# Patient Record
Sex: FEMALE | Race: BLACK OR AFRICAN AMERICAN | Employment: FULL TIME | URBAN - METROPOLITAN AREA
[De-identification: names, ages, dates, MRNs, and addresses within clinical notes are randomized per-mention and may not be internally consistent; named-entity substitution may affect disease eponyms.]

---

## 2019-06-23 ENCOUNTER — HOSPITAL ENCOUNTER (EMERGENCY)
Age: 44
Discharge: HOME | End: 2019-06-23
Attending: STUDENT IN AN ORGANIZED HEALTH CARE EDUCATION/TRAINING PROGRAM
Payer: COMMERCIAL

## 2019-06-23 ENCOUNTER — APPOINTMENT (OUTPATIENT)
Dept: ULTRASOUND IMAGING | Age: 44
End: 2019-06-23
Attending: PHYSICIAN ASSISTANT
Payer: COMMERCIAL

## 2019-06-23 VITALS
SYSTOLIC BLOOD PRESSURE: 114 MMHG | HEART RATE: 62 BPM | DIASTOLIC BLOOD PRESSURE: 72 MMHG | HEIGHT: 57 IN | OXYGEN SATURATION: 100 % | BODY MASS INDEX: 47.03 KG/M2 | TEMPERATURE: 97.9 F | WEIGHT: 218 LBS | RESPIRATION RATE: 17 BRPM

## 2019-06-23 DIAGNOSIS — R10.13 ABDOMINAL PAIN, EPIGASTRIC: Primary | ICD-10-CM

## 2019-06-23 DIAGNOSIS — K80.20 GALLSTONES: ICD-10-CM

## 2019-06-23 LAB
ALBUMIN SERPL-MCNC: 3.7 G/DL (ref 3.5–5)
ALBUMIN/GLOB SERPL: 0.9 {RATIO} (ref 1.1–2.2)
ALP SERPL-CCNC: 71 U/L (ref 45–117)
ALT SERPL-CCNC: 17 U/L (ref 12–78)
ANION GAP SERPL CALC-SCNC: 7 MMOL/L (ref 5–15)
APPEARANCE UR: CLEAR
AST SERPL-CCNC: 12 U/L (ref 15–37)
BACTERIA URNS QL MICRO: NEGATIVE /HPF
BASOPHILS # BLD: 0.1 K/UL (ref 0–0.1)
BASOPHILS NFR BLD: 1 % (ref 0–1)
BILIRUB SERPL-MCNC: 0.4 MG/DL (ref 0.2–1)
BILIRUB UR QL: NEGATIVE
BUN SERPL-MCNC: 10 MG/DL (ref 6–20)
BUN/CREAT SERPL: 13 (ref 12–20)
CALCIUM SERPL-MCNC: 9 MG/DL (ref 8.5–10.1)
CHLORIDE SERPL-SCNC: 107 MMOL/L (ref 97–108)
CO2 SERPL-SCNC: 24 MMOL/L (ref 21–32)
COLOR UR: NORMAL
COMMENT, HOLDF: NORMAL
CREAT SERPL-MCNC: 0.79 MG/DL (ref 0.55–1.02)
DIFFERENTIAL METHOD BLD: ABNORMAL
EOSINOPHIL # BLD: 0 K/UL (ref 0–0.4)
EOSINOPHIL NFR BLD: 0 % (ref 0–7)
EPITH CASTS URNS QL MICRO: NORMAL /LPF
ERYTHROCYTE [DISTWIDTH] IN BLOOD BY AUTOMATED COUNT: 13.9 % (ref 11.5–14.5)
GLOBULIN SER CALC-MCNC: 4.1 G/DL (ref 2–4)
GLUCOSE SERPL-MCNC: 94 MG/DL (ref 65–100)
GLUCOSE UR STRIP.AUTO-MCNC: NEGATIVE MG/DL
HCT VFR BLD AUTO: 37.3 % (ref 35–47)
HGB BLD-MCNC: 11.6 G/DL (ref 11.5–16)
HGB UR QL STRIP: NEGATIVE
HYALINE CASTS URNS QL MICRO: NORMAL /LPF (ref 0–5)
IMM GRANULOCYTES # BLD AUTO: 0 K/UL (ref 0–0.04)
IMM GRANULOCYTES NFR BLD AUTO: 0 % (ref 0–0.5)
KETONES UR QL STRIP.AUTO: NEGATIVE MG/DL
LEUKOCYTE ESTERASE UR QL STRIP.AUTO: NEGATIVE
LIPASE SERPL-CCNC: 86 U/L (ref 73–393)
LYMPHOCYTES # BLD: 1.6 K/UL (ref 0.8–3.5)
LYMPHOCYTES NFR BLD: 19 % (ref 12–49)
MCH RBC QN AUTO: 27.8 PG (ref 26–34)
MCHC RBC AUTO-ENTMCNC: 31.1 G/DL (ref 30–36.5)
MCV RBC AUTO: 89.4 FL (ref 80–99)
MONOCYTES # BLD: 0.3 K/UL (ref 0–1)
MONOCYTES NFR BLD: 4 % (ref 5–13)
NEUTS SEG # BLD: 6.6 K/UL (ref 1.8–8)
NEUTS SEG NFR BLD: 76 % (ref 32–75)
NITRITE UR QL STRIP.AUTO: NEGATIVE
NRBC # BLD: 0 K/UL (ref 0–0.01)
NRBC BLD-RTO: 0 PER 100 WBC
PH UR STRIP: 7.5 [PH] (ref 5–8)
PLATELET # BLD AUTO: 346 K/UL (ref 150–400)
PMV BLD AUTO: 9.8 FL (ref 8.9–12.9)
POTASSIUM SERPL-SCNC: 4 MMOL/L (ref 3.5–5.1)
PROT SERPL-MCNC: 7.8 G/DL (ref 6.4–8.2)
PROT UR STRIP-MCNC: NEGATIVE MG/DL
RBC # BLD AUTO: 4.17 M/UL (ref 3.8–5.2)
RBC #/AREA URNS HPF: NORMAL /HPF (ref 0–5)
SAMPLES BEING HELD,HOLD: NORMAL
SODIUM SERPL-SCNC: 138 MMOL/L (ref 136–145)
SP GR UR REFRACTOMETRY: 1.02 (ref 1–1.03)
TROPONIN I SERPL-MCNC: <0.05 NG/ML
UR CULT HOLD, URHOLD: NORMAL
UROBILINOGEN UR QL STRIP.AUTO: 1 EU/DL (ref 0.2–1)
WBC # BLD AUTO: 8.6 K/UL (ref 3.6–11)
WBC URNS QL MICRO: NORMAL /HPF (ref 0–4)

## 2019-06-23 PROCEDURE — 93005 ELECTROCARDIOGRAM TRACING: CPT

## 2019-06-23 PROCEDURE — 99284 EMERGENCY DEPT VISIT MOD MDM: CPT

## 2019-06-23 PROCEDURE — 96361 HYDRATE IV INFUSION ADD-ON: CPT

## 2019-06-23 PROCEDURE — 96375 TX/PRO/DX INJ NEW DRUG ADDON: CPT

## 2019-06-23 PROCEDURE — 76705 ECHO EXAM OF ABDOMEN: CPT

## 2019-06-23 PROCEDURE — 36415 COLL VENOUS BLD VENIPUNCTURE: CPT

## 2019-06-23 PROCEDURE — 83690 ASSAY OF LIPASE: CPT

## 2019-06-23 PROCEDURE — 96374 THER/PROPH/DIAG INJ IV PUSH: CPT

## 2019-06-23 PROCEDURE — 85025 COMPLETE CBC W/AUTO DIFF WBC: CPT

## 2019-06-23 PROCEDURE — 84484 ASSAY OF TROPONIN QUANT: CPT

## 2019-06-23 PROCEDURE — 80053 COMPREHEN METABOLIC PANEL: CPT

## 2019-06-23 PROCEDURE — 81001 URINALYSIS AUTO W/SCOPE: CPT

## 2019-06-23 PROCEDURE — 74011250636 HC RX REV CODE- 250/636: Performed by: PHYSICIAN ASSISTANT

## 2019-06-23 RX ORDER — ONDANSETRON 4 MG/1
4 TABLET, ORALLY DISINTEGRATING ORAL
Qty: 10 TAB | Refills: 0 | Status: SHIPPED | OUTPATIENT
Start: 2019-06-23 | End: 2019-07-15

## 2019-06-23 RX ORDER — OMEPRAZOLE 20 MG/1
20 TABLET, DELAYED RELEASE ORAL DAILY
Qty: 20 TAB | Refills: 0 | Status: SHIPPED | OUTPATIENT
Start: 2019-06-23

## 2019-06-23 RX ORDER — ONDANSETRON 2 MG/ML
4 INJECTION INTRAMUSCULAR; INTRAVENOUS
Status: COMPLETED | OUTPATIENT
Start: 2019-06-23 | End: 2019-06-23

## 2019-06-23 RX ORDER — KETOROLAC TROMETHAMINE 30 MG/ML
15 INJECTION, SOLUTION INTRAMUSCULAR; INTRAVENOUS
Status: COMPLETED | OUTPATIENT
Start: 2019-06-23 | End: 2019-06-23

## 2019-06-23 RX ADMIN — SODIUM CHLORIDE 1000 ML: 900 INJECTION, SOLUTION INTRAVENOUS at 09:05

## 2019-06-23 RX ADMIN — KETOROLAC TROMETHAMINE 15 MG: 30 INJECTION, SOLUTION INTRAMUSCULAR at 10:03

## 2019-06-23 RX ADMIN — ONDANSETRON 4 MG: 2 INJECTION INTRAMUSCULAR; INTRAVENOUS at 09:05

## 2019-06-23 NOTE — ED TRIAGE NOTES
Pt reports mid abd pain that radiates into right back that began last night. Pt also reports nausea and vomiting x 1 this morning. Pt denies urinary symptoms.

## 2019-06-23 NOTE — ED PROVIDER NOTES
37year old female presenting to the ED for abdominal pain. Pt reports epigastric abdominal pain, dull and stabbing, that radiates to the back, started at about 9PM last night. Kept pt up overnight. Radiates tot he back on the right side. Had chicken tenders and onion rings for dinner last night. Started vomiting this morning, vomited x 1, no hematemesis. No fever or chills. No diarrhea or constipation. No frequent NSAID use. No significant alcohol use. Notes that she will sometimes have similar pain after larger meals. Feels similar to when she was diagnosed with gallstones many years ago. No UTI symptoms. PMHx: denies  PSx: myomectomy  Social: no tobacco, alcohol, or drug use           History reviewed. No pertinent past medical history.     Past Surgical History:   Procedure Laterality Date    HX OTHER SURGICAL      myomectomy 09    MYOMECTOMY 1-4,W/TOT 250GMS/<,ABD APPRCH           Family History:   Problem Relation Age of Onset    Hypertension Mother     Hypertension Father     Cancer Maternal Uncle         prostate    Hypertension Sister     Heart Disease Paternal Grandmother         MI    Hypertension Paternal Grandmother        Social History     Socioeconomic History    Marital status:      Spouse name: Not on file    Number of children: Not on file    Years of education: Not on file    Highest education level: Not on file   Occupational History    Occupation:      Employer: [employer] resource strain: Not on file    Food insecurity:     Worry: Not on file     Inability: Not on file    Transportation needs:     Medical: Not on file     Non-medical: Not on file   Tobacco Use    Smoking status: Never Smoker    Smokeless tobacco: Never Used   Substance and Sexual Activity    Alcohol use: No    Drug use: No    Sexual activity: Yes     Partners: Male     Birth control/protection: None     Comment:    Lifestyle    Physical activity:     Days per week: Not on file     Minutes per session: Not on file    Stress: Not on file   Relationships    Social connections:     Talks on phone: Not on file     Gets together: Not on file     Attends Sikhism service: Not on file     Active member of club or organization: Not on file     Attends meetings of clubs or organizations: Not on file     Relationship status: Not on file    Intimate partner violence:     Fear of current or ex partner: Not on file     Emotionally abused: Not on file     Physically abused: Not on file     Forced sexual activity: Not on file   Other Topics Concern    Not on file   Social History Narrative    Not on file         ALLERGIES: Patient has no known allergies. Review of Systems   Constitutional: Negative for fever. Respiratory: Negative for shortness of breath. Cardiovascular: Negative for chest pain. Gastrointestinal: Positive for abdominal pain, nausea and vomiting. Genitourinary: Negative for dysuria. Musculoskeletal: Negative for neck stiffness. Skin: Negative for wound. Neurological: Negative for syncope. All other systems reviewed and are negative. Vitals:    06/23/19 0806 06/23/19 0831 06/23/19 1112   BP:  120/81 114/72   Pulse: 88 68 62   Resp:  16 17   Temp:  98.2 °F (36.8 °C) 97.9 °F (36.6 °C)   SpO2: 100% 100% 100%   Weight:  98.9 kg (218 lb)    Height:  4' 9\" (1.448 m)             Physical Exam   Constitutional: She is oriented to person, place, and time. She appears well-developed and well-nourished. No distress. HENT:   Head: Normocephalic and atraumatic. Right Ear: External ear normal.   Left Ear: External ear normal.   Eyes: Conjunctivae are normal. No scleral icterus. Neck: Neck supple. No tracheal deviation present. Cardiovascular: Normal rate, regular rhythm and normal heart sounds. Exam reveals no gallop and no friction rub. No murmur heard.   Pulmonary/Chest: Effort normal and breath sounds normal. No stridor. No respiratory distress. She has no wheezes. Abdominal: Soft. She exhibits no distension. Mild epigastric tenderness  No true Mclaughlin's sign   Musculoskeletal: Normal range of motion. Neurological: She is alert and oriented to person, place, and time. Skin: Skin is warm and dry. Psychiatric: She has a normal mood and affect. Her behavior is normal.   Nursing note and vitals reviewed. MDM  Number of Diagnoses or Management Options  Abdominal pain, epigastric:   Gallstones:   Diagnosis management comments: 37year old female presenting for epigastric pain since last night. Known gallstones, no RUQ TTP on exam.  Labs remarkable for normal WBC, LFTs, lipase. Much improved with fluids, toradol. US showing gallstones without cholecystitis. Discussed with pt possible pain related to gallstones, possible PUD/gastritis. Will try prilosec, encouraged low fat diet, given GI and general surgery for follow up. Return precautions given.        Amount and/or Complexity of Data Reviewed  Clinical lab tests: ordered and reviewed  Tests in the radiology section of CPT®: ordered and reviewed  Discuss the patient with other providers: yes (Dr. Khalida Rose ED attending)           Procedures

## 2019-06-23 NOTE — DISCHARGE INSTRUCTIONS
Patient Education        Abdominal Pain: Care Instructions  Your Care Instructions    Abdominal pain has many possible causes. Some aren't serious and get better on their own in a few days. Others need more testing and treatment. If your pain continues or gets worse, you need to be rechecked and may need more tests to find out what is wrong. You may need surgery to correct the problem. Don't ignore new symptoms, such as fever, nausea and vomiting, urination problems, pain that gets worse, and dizziness. These may be signs of a more serious problem. Your doctor may have recommended a follow-up visit in the next 8 to 12 hours. If you are not getting better, you may need more tests or treatment. The doctor has checked you carefully, but problems can develop later. If you notice any problems or new symptoms, get medical treatment right away. Follow-up care is a key part of your treatment and safety. Be sure to make and go to all appointments, and call your doctor if you are having problems. It's also a good idea to know your test results and keep a list of the medicines you take. How can you care for yourself at home? · Rest until you feel better. · To prevent dehydration, drink plenty of fluids, enough so that your urine is light yellow or clear like water. Choose water and other caffeine-free clear liquids until you feel better. If you have kidney, heart, or liver disease and have to limit fluids, talk with your doctor before you increase the amount of fluids you drink. · If your stomach is upset, eat mild foods, such as rice, dry toast or crackers, bananas, and applesauce. Try eating several small meals instead of two or three large ones. · Wait until 48 hours after all symptoms have gone away before you have spicy foods, alcohol, and drinks that contain caffeine. · Do not eat foods that are high in fat. · Avoid anti-inflammatory medicines such as aspirin, ibuprofen (Advil, Motrin), and naproxen (Aleve). These can cause stomach upset. Talk to your doctor if you take daily aspirin for another health problem. When should you call for help? Call 911 anytime you think you may need emergency care. For example, call if:    · You passed out (lost consciousness).     · You pass maroon or very bloody stools.     · You vomit blood or what looks like coffee grounds.     · You have new, severe belly pain.    Call your doctor now or seek immediate medical care if:    · Your pain gets worse, especially if it becomes focused in one area of your belly.     · You have a new or higher fever.     · Your stools are black and look like tar, or they have streaks of blood.     · You have unexpected vaginal bleeding.     · You have symptoms of a urinary tract infection. These may include:  ? Pain when you urinate. ? Urinating more often than usual.  ? Blood in your urine.     · You are dizzy or lightheaded, or you feel like you may faint.    Watch closely for changes in your health, and be sure to contact your doctor if:    · You are not getting better after 1 day (24 hours). Where can you learn more? Go to http://chino-daynel.info/. Enter T496 in the search box to learn more about \"Abdominal Pain: Care Instructions. \"  Current as of: September 23, 2018  Content Version: 11.9  © 5817-6200 [employer]. Care instructions adapted under license by [employer] (which disclaims liability or warranty for this information). If you have questions about a medical condition or this instruction, always ask your healthcare professional. [place] [address] any warranty or liability for your use of this information. Patient Education        Low-Fat Diet for Gallbladder Disease: Care Instructions  Your Care Instructions    When you eat, the gallbladder releases bile, which helps you digest the fat in food. If you have an inflamed gallbladder, this may cause pain.  A low-fat diet may give your gallbladder a rest so you can start to heal. Your doctor and dietitian can help you make an eating plan that does not irritate your digestive system. Always talk with your doctor or dietitian before you make changes in your diet. Follow-up care is a key part of your treatment and safety. Be sure to make and go to all appointments, and call your doctor if you are having problems. It's also a good idea to know your test results and keep a list of the medicines you take. How can you care for yourself at home? · Eat many small meals and snacks each day instead of three large meals. · Choose lean meats. ? Eat no more than 5 to 6½ ounces of meat a day. ? Cut off all fat you can see. ? Eat chicken and turkey without the skin. ? Many types of fish, such as salmon, lake trout, tuna, and herring, provide healthy omega-3 fat. But, avoid fish canned in oil, such as sardines in olive oil. ? Bake, broil, or grill meats, poultry, or fish instead of frying them in butter or fat. · Drink or eat nonfat or low-fat milk, yogurt, cheese, or other milk products each day. ? Read the labels on cheeses, and choose those with less than 5 grams of fat an ounce. ? Try fat-free sour cream, cream cheese, or yogurt. ? Avoid cream soups and cream sauces on pasta. ? Eat low-fat ice cream, frozen yogurt, or sorbet. Avoid regular ice cream.  · Eat whole-grain cereals, breads, crackers, rice, or pasta. Avoid high-fat foods such as croissants, scones, biscuits, waffles, doughnuts, muffins, granola, and high-fat breads. · Flavor your foods with herbs and spices (such as basil, tarragon, or mint), fat-free sauces, or lemon juice instead of butter. You can also use butter substitutes, fat-free mayonnaise, or fat-free dressing. · Try applesauce, prune puree, or mashed bananas to replace some or all of the fat when you bake.   · Limit fats and oils, such as butter, margarine, mayonnaise, and salad dressing, to no more than 1 tablespoon a meal.  · Avoid high-fat foods, such as:  ? Chocolate, whole milk, ice cream, and processed cheese. ? Fried or buttered foods. ? Sausage, salami, and frederick. ? Cinnamon rolls, cakes, pies, cookies, and other pastries. ? Prepared snack foods, such as potato chips, nut and granola bars, and mixed nuts. ? Coconut and avocado. · Learn how to read food labels for serving sizes and ingredients. Fast-food and convenience-food meals often have lots of fat. Where can you learn more? Go to http://chino-danyel.info/. Enter J906 in the search box to learn more about \"Low-Fat Diet for Gallbladder Disease: Care Instructions. \"  Current as of: March 28, 2018  Content Version: 11.9  © 7229-3769 [employer]. Care instructions adapted under license by [employer] (which disclaims liability or warranty for this information). If you have questions about a medical condition or this instruction, always ask your healthcare professional. [place] [address] any warranty or liability for your use of this information. Patient Education        Biliary Colic: Care Instructions  Your Care Instructions    Biliary (say \"BILL-ee-air-ee\") colic is belly pain caused by gallbladder problems. It is usually caused by a gallstone moving through or blocking the common bile duct or cystic duct. Gallstones are stones that form in the gallbladder. They are made of cholesterol and other substances. The gallbladder is a small sac located just under the liver. It stores bile released by the liver. Bile helps you digest fats. Gallstones also can form in the common bile duct or cystic duct. These ducts carry bile from the gallbladder and the liver to the small intestine. Gallstones may be as small as a grain of sand or as large as a golf ball. Gallstones that cause severe symptoms usually are treated with surgery to remove the gallbladder.  If the first attack of biliary colic is mild, it is often safe to wait until you have had another attack before you think about having surgery. The doctor has checked you carefully, but problems can develop later. If you notice any problems or new symptoms, get medical treatment right away. Follow-up care is a key part of your treatment and safety. Be sure to make and go to all appointments, and call your doctor if you are having problems. It's also a good idea to know your test results and keep a list of the medicines you take. How can you care for yourself at home? · Take pain medicines exactly as directed. ? If the doctor gave you a prescription medicine for pain, take it as prescribed. ? If you are not taking a prescription pain medicine, ask your doctor if you can take an over-the-counter medicine. Read and follow all instructions on the label. · Avoid foods that cause symptoms, especially fatty foods. These can cause biliary colic. · You may need more tests to look at your gallbladder. When should you call for help? Call your doctor now or seek immediate medical care if:    · You have a fever.     · You have new belly pain, or your pain gets worse.     · There is a new or increasing yellow tint to your skin or the whites of your eyes.     · Your urine is dark yellow-brown, or your stools are light-colored or white.     · You cannot keep down fluids.    Watch closely for changes in your health, and be sure to contact your doctor if:    · You do not get better as expected.     · You are not getting better after 1 day (24 hours). Where can you learn more? Go to http://chino-danyel.info/. Enter T505 in the search box to learn more about \"Biliary Colic: Care Instructions. \"  Current as of: March 27, 2018  Content Version: 11.9  © 8953-1904 [employer]. Care instructions adapted under license by [employer] (which disclaims liability or warranty for this information).  If you have questions about a medical condition or this instruction, always ask your healthcare professional. [place] [address] any warranty or liability for your use of this information.

## 2019-06-23 NOTE — ED NOTES
1114 PA reviewed discharge instructions with the patient. The patient verbalized understanding. Patient ambulated out of ED with . No complaints or concerns noted.

## 2019-06-24 LAB
ATRIAL RATE: 62 BPM
CALCULATED P AXIS, ECG09: 24 DEGREES
CALCULATED R AXIS, ECG10: 16 DEGREES
CALCULATED T AXIS, ECG11: 6 DEGREES
DIAGNOSIS, 93000: NORMAL
P-R INTERVAL, ECG05: 132 MS
Q-T INTERVAL, ECG07: 402 MS
QRS DURATION, ECG06: 72 MS
QTC CALCULATION (BEZET), ECG08: 408 MS
VENTRICULAR RATE, ECG03: 62 BPM

## 2019-07-12 ENCOUNTER — TELEPHONE (OUTPATIENT)
Dept: FAMILY MEDICINE CLINIC | Age: 44
End: 2019-07-12

## 2019-07-15 ENCOUNTER — OFFICE VISIT (OUTPATIENT)
Dept: FAMILY MEDICINE CLINIC | Age: 44
End: 2019-07-15

## 2019-07-15 VITALS
WEIGHT: 223.8 LBS | HEIGHT: 60 IN | HEART RATE: 72 BPM | TEMPERATURE: 97.4 F | OXYGEN SATURATION: 98 % | BODY MASS INDEX: 43.94 KG/M2 | SYSTOLIC BLOOD PRESSURE: 115 MMHG | RESPIRATION RATE: 16 BRPM | DIASTOLIC BLOOD PRESSURE: 54 MMHG

## 2019-07-15 DIAGNOSIS — K80.20 GALL STONES: Primary | ICD-10-CM

## 2019-07-15 DIAGNOSIS — Z13.21 ENCOUNTER FOR VITAMIN DEFICIENCY SCREENING: ICD-10-CM

## 2019-07-15 DIAGNOSIS — E55.9 VITAMIN D DEFICIENCY: ICD-10-CM

## 2019-07-15 DIAGNOSIS — E66.01 OBESITY, MORBID (HCC): ICD-10-CM

## 2019-07-15 DIAGNOSIS — Z00.00 PHYSICAL EXAM, ANNUAL: ICD-10-CM

## 2019-07-15 RX ORDER — PHENTERMINE HYDROCHLORIDE 37.5 MG/1
37.5 TABLET ORAL
Qty: 30 TAB | Refills: 0 | Status: SHIPPED | OUTPATIENT
Start: 2019-07-15 | End: 2019-08-27 | Stop reason: SDUPTHER

## 2019-07-15 NOTE — PROGRESS NOTES
HISTORY OF PRESENT ILLNESS  Amy Millan is a 37 y.o. female. HPI  The patient presents today to establish care. Previous PCP was Dr. Lani Valente in [place]. Last office visit 2017. PMHx is significant for morbid obesity, vit D deficiency, gall stones. PSHx is significant for myomectomy (2009), tubal ligation. FHx is significant for hypertension, CKD. She has 2 children, aged 10 and 6. 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 Medical Problems:  1. denies    Current Specialists:  1. Dr. Mauricio Herrera, OB/GYN - has appt upcoming in August 2019    Acute complaints today:  1. She went to the ED in June 2019 and epigastric pain, where she was diagnosed with gallstones. She was diagnosed with similar in 2017 and was given foods to stay away from, which she was compliant with. However, she started eating out a lot as her mom was recently hospitalized and her diet went down. She reports that her symptoms are improved, but still can feel it from time to time. This is her second episode in 2 years. She eats blueberries, yogurt, granola for breakfast; occasionally eats frederick with a croissant. Was previously on [facility] diet, where she learned a lot about how to eat. Her problem is with night snacking. It was too expensive for her, as it was $30/week. She started the program at 242 lbs and got down to 211 lbs but is now back up to 223 lbs. Her mother lives with her, she is raising 2 children, and her  works out of town frequently. She does not exercise much, as she doesn't have the time. She reports that she doesn't eat right all the time. She reports that sweets and chips are her kryptonite. She has an veronica to track her food intake, but doesn't use it much.      Preventative Care  TDaP: April 2013  Pap smear: 2017, per patient report; has upcoming appt with GYN  Mammogram: 2017; scheduled July 25, 2019 at [address] [place] [address] in [place]  Colonoscopy: has never had  Denies family history of colon CA; occasional rectal bleeding with straining. Healthy Habits  Patient is exercising 0x per week. Patient endorses healthy diet; avoids fatty/greasy foods, sugar-sweetened beverages. Denies tobacco use. Denies alcohol use. Denies illicit drug use. Sexually active with 1 male partner in last 12 months. Uses tubal ligation for contraception. Denies concerns for STIs. LMP 7/14/19. History reviewed. No pertinent past medical history. Past Surgical History:   Procedure Laterality Date    HX OTHER SURGICAL      myomectomy 09    HX TUBAL LIGATION  2013    MYOMECTOMY 1-4,W/TOT 250GMS/<, [facility]  2009     Family History   Problem Relation Age of Onset    Hypertension Mother     Hypertension Father     Kidney Disease Father     Cancer Maternal Uncle         prostate    Hypertension Sister     Heart Disease Paternal Grandmother         MI    Hypertension Paternal Grandmother      Social History     Socioeconomic History    Marital status:      Spouse name: Not on file    Number of children: Not on file    Years of education: Not on file    Highest education level: Not on file   Occupational History    Occupation:      Employer: [employer]   Tobacco Use    Smoking status: Never Smoker    Smokeless tobacco: Never Used   Substance and Sexual Activity    Alcohol use: No    Drug use: No    Sexual activity: Yes     Partners: Male     Birth control/protection: None, Surgical     Comment:      Patient Active Problem List   Diagnosis Code    Vitamin D deficiency E55.9    Ob[facility], morbid ([facility] E66.01     Outpatient Encounter Medications as of 7/15/2019   Medication Sig Dispense Refill    phentermine (ADIPEX-P) 37.5 mg tablet Take 1 Tab by mouth every morning. Max Daily Amount: 37.5 mg. Indications: Weight Loss Management for an Obese Person 30 Tab 0    omeprazole (PRILOSEC OTC) 20 mg tablet Take 1 Tab by mouth daily.  (Patient taking differently: Take 20 mg by mouth daily as needed.) 20 Tab 0    [DISCONTINUED] ondansetron (ZOFRAN ODT) 4 mg disintegrating tablet Take 1 Tab by mouth every eight (8) hours as needed for Nausea. 10 Tab 0     No facility-administered encounter medications on file as of 7/15/2019. Visit Vitals  /54 (BP 1 Location: Right arm, BP Patient Position: Sitting)   Pulse 72   Temp 97.4 °F (36.3 °C) (Oral)   Resp 16   Ht 5' (1.524 m)   Wt 223 lb 12.8 oz (101.5 kg)   LMP 07/14/2019 (Exact Date)   SpO2 98%   Breastfeeding? No   BMI 43.71 kg/m²         Review of Systems   Constitutional: Negative for chills, fever and malaise/fatigue. Eyes: Negative for blurred vision and double vision. Respiratory: Negative for cough and shortness of breath. Cardiovascular: Negative for chest pain, palpitations, orthopnea and leg swelling. Gastrointestinal: Negative for abdominal pain. Neurological: Negative for dizziness and headaches. Psychiatric/Behavioral: Negative for depression, substance abuse and suicidal ideas. Physical Exam   Constitutional: She is oriented to person, place, and time. She appears well-developed and well-nourished. No distress. Obese female   HENT:   Head: Normocephalic and atraumatic. Neck: No thyromegaly present. Cardiovascular: Normal rate, regular rhythm and normal heart sounds. Pulmonary/Chest: Effort normal and breath sounds normal. No respiratory distress. She has no wheezes. Neurological: She is alert and oriented to person, place, and time. Skin: Skin is warm and dry. She is not diaphoretic. Psychiatric: She has a normal mood and affect. Her behavior is normal. Judgment normal.   Nursing note and vitals reviewed. ASSESSMENT and PLAN    ICD-10-CM ICD-9-CM    1. Gall stones K80.20 574.20    2. Obesity, morbid ([facility] E66.01 278.01 phentermine (ADIPEX-P) 37.5 mg tablet   3.  Vitamin D deficiency E55.9 268.9 VITAMIN D, 25 HYDROXY   4. Encounter for vitamin deficiency screening Z13.21 V77.99 VITAMIN B12   5. Physical exam, annual Z00.00 V70.0 CBC W/O DIFF      METABOLIC PANEL, COMPREHENSIVE      LIPID PANEL AND CHOL/HDL RATIO      TSH RFX ON ABNORMAL TO FREE T4     1. Establish care - The patient's medications, allergies, and history were all updated today. The patient has signed a records release to request records from previous PCP and GYN. 2. Gall stones - I reviewed the notes and test results from her recent ED visits, and we discussed the etiology of gall stones. She wishes to wait on seeing a surgeon and instead focus on weight loss. 3. Morbid obesity - We discussed the role of diet and exercise in weight loss. She reports that she has had good success in the past with phentermine, prescribed through [facility] ( reviewed today) and wishes to try this again to \"kick start\" her weight loss efforts. Her LFTs were normal in the ED a few weeks ago and her BP is normotensive, so I agreed to this but explained that this medication is indicated only for short-term use, and she agrees. We discussed the use of a low-carb diet and reviewed nutrition labels today. Labs as ordered above. Will have her return for a CPE and lab/med follow up in about 4 weeks. Discussed that medication will be continued only for a max of 3 months, and she agrees. Follow-up and Dispositions    · Return in about 1 month (around 8/12/2019) for CPE and lab/med follow up, sooner PRN.

## 2019-07-15 NOTE — PATIENT INSTRUCTIONS
Biliary Colic: Care Instructions Your Care Instructions Biliary (say \"BILL-ee-air-ee\") colic is belly pain caused by gallbladder problems. It is usually caused by a gallstone moving through or blocking the common bile duct or cystic duct. Gallstones are stones that form in the gallbladder. They are made of cholesterol and other substances. The gallbladder is a small sac located just under the liver. It stores bile released by the liver. Bile helps you digest fats. Gallstones also can form in the common bile duct or cystic duct. These ducts carry bile from the gallbladder and the liver to the small intestine. Gallstones may be as small as a grain of sand or as large as a golf ball. Gallstones that cause severe symptoms usually are treated with surgery to remove the gallbladder. If the first attack of biliary colic is mild, it is often safe to wait until you have had another attack before you think about having surgery. The doctor has checked you carefully, but problems can develop later. If you notice any problems or new symptoms, get medical treatment right away. Follow-up care is a key part of your treatment and safety. Be sure to make and go to all appointments, and call your doctor if you are having problems. It's also a good idea to know your test results and keep a list of the medicines you take. How can you care for yourself at home? · Take pain medicines exactly as directed. ? If the doctor gave you a prescription medicine for pain, take it as prescribed. ? If you are not taking a prescription pain medicine, ask your doctor if you can take an over-the-counter medicine. Read and follow all instructions on the label. · Avoid foods that cause symptoms, especially fatty foods. These can cause biliary colic. · You may need more tests to look at your gallbladder. When should you call for help? Call your doctor now or seek immediate medical care if: 
  · You have a fever.   · You have new belly pain, or your pain gets worse.  
  · There is a new or increasing yellow tint to your skin or the whites of your eyes.  
  · Your urine is dark yellow-brown, or your stools are light-colored or white.  
  · You cannot keep down fluids.  
 Watch closely for changes in your health, and be sure to contact your doctor if: 
  · You do not get better as expected.  
  · You are not getting better after 1 day (24 hours). Where can you learn more? Go to http://chino-danyel.info/. Enter C077 in the search box to learn more about \"Biliary Colic: Care Instructions. \" Current as of: March 27, 2018 Content Version: 11.9 © 3425-4881 [employer]. Care instructions adapted under license by [employer] (which disclaims liability or warranty for this information). If you have questions about a medical condition or this instruction, always ask your healthcare professional. [place] [address] any warranty or liability for your use of this information.

## 2019-07-15 NOTE — PROGRESS NOTES
Chief Complaint   Patient presents with   [place] Establish Care     1. Have you been to the ER, urgent care clinic since your last visit? Hospitalized since your last visit? Yes When: 3 weeks ago - Gallbladder - [facility]    2. Have you seen or consulted any other health care providers outside of the [address] [place] [address] since your last visit? Include any pap smears or colon screening. No     Patient here to establish care.   Previous PCP: Dr. Junior Harding, [place]   Patient sees the following specialist 1)Dr. Iza Cook - GYN  Release of Medical Information signed by patient today yes  Patient Concerns est pcp, weight loss and follow up ER - gallbladder

## 2019-07-16 LAB
25(OH)D3+25(OH)D2 SERPL-MCNC: 17.7 NG/ML (ref 30–100)
ALBUMIN SERPL-MCNC: 4.2 G/DL (ref 3.5–5.5)
ALBUMIN/GLOB SERPL: 1.3 {RATIO} (ref 1.2–2.2)
ALP SERPL-CCNC: 67 IU/L (ref 39–117)
ALT SERPL-CCNC: 15 IU/L (ref 0–32)
AST SERPL-CCNC: 10 IU/L (ref 0–40)
BILIRUB SERPL-MCNC: 0.3 MG/DL (ref 0–1.2)
BUN SERPL-MCNC: 11 MG/DL (ref 6–24)
BUN/CREAT SERPL: 15 (ref 9–23)
CALCIUM SERPL-MCNC: 9.2 MG/DL (ref 8.7–10.2)
CHLORIDE SERPL-SCNC: 104 MMOL/L (ref 96–106)
CHOLEST SERPL-MCNC: 155 MG/DL (ref 100–199)
CHOLEST/HDLC SERPL: 2.3 RATIO (ref 0–4.4)
CO2 SERPL-SCNC: 23 MMOL/L (ref 20–29)
CREAT SERPL-MCNC: 0.72 MG/DL (ref 0.57–1)
ERYTHROCYTE [DISTWIDTH] IN BLOOD BY AUTOMATED COUNT: 12.8 % (ref 12.3–15.4)
GLOBULIN SER CALC-MCNC: 3.2 G/DL (ref 1.5–4.5)
GLUCOSE SERPL-MCNC: 84 MG/DL (ref 65–99)
HCT VFR BLD AUTO: 35.7 % (ref 34–46.6)
HDLC SERPL-MCNC: 68 MG/DL
HGB BLD-MCNC: 11 G/DL (ref 11.1–15.9)
INTERPRETATION, 910389: NORMAL
LDLC SERPL CALC-MCNC: 79 MG/DL (ref 0–99)
MCH RBC QN AUTO: 27.2 PG (ref 26.6–33)
MCHC RBC AUTO-ENTMCNC: 30.8 G/DL (ref 31.5–35.7)
MCV RBC AUTO: 88 FL (ref 79–97)
PLATELET # BLD AUTO: 361 X10E3/UL (ref 150–450)
POTASSIUM SERPL-SCNC: 4.6 MMOL/L (ref 3.5–5.2)
PROT SERPL-MCNC: 7.4 G/DL (ref 6–8.5)
RBC # BLD AUTO: 4.04 X10E6/UL (ref 3.77–5.28)
SODIUM SERPL-SCNC: 140 MMOL/L (ref 134–144)
TRIGL SERPL-MCNC: 39 MG/DL (ref 0–149)
TSH SERPL DL<=0.005 MIU/L-ACNC: 0.96 UIU/ML (ref 0.45–4.5)
VIT B12 SERPL-MCNC: 1427 PG/ML (ref 232–1245)
VLDLC SERPL CALC-MCNC: 8 MG/DL (ref 5–40)
WBC # BLD AUTO: 6.2 X10E3/UL (ref 3.4–10.8)

## 2019-08-01 ENCOUNTER — DOCUMENTATION ONLY (OUTPATIENT)
Dept: FAMILY MEDICINE CLINIC | Age: 44
End: 2019-08-01

## 2019-08-01 NOTE — PROGRESS NOTES
Records received from Frida Monaco MD, at [facility], previous PCP. Previously on 15mg phentermine daily. History of vit D deficiency. No preventative care records received.

## 2019-08-27 ENCOUNTER — OFFICE VISIT (OUTPATIENT)
Dept: FAMILY MEDICINE CLINIC | Age: 44
End: 2019-08-27

## 2019-08-27 VITALS
WEIGHT: 221.8 LBS | SYSTOLIC BLOOD PRESSURE: 117 MMHG | HEIGHT: 60 IN | DIASTOLIC BLOOD PRESSURE: 59 MMHG | BODY MASS INDEX: 43.55 KG/M2 | HEART RATE: 75 BPM | TEMPERATURE: 98.7 F | RESPIRATION RATE: 16 BRPM

## 2019-08-27 DIAGNOSIS — E66.01 OBESITY, MORBID (HCC): ICD-10-CM

## 2019-08-27 DIAGNOSIS — E55.9 VITAMIN D DEFICIENCY: ICD-10-CM

## 2019-08-27 DIAGNOSIS — Z51.81 ENCOUNTER FOR MEDICATION MONITORING: ICD-10-CM

## 2019-08-27 DIAGNOSIS — Z00.00 ENCOUNTER FOR ANNUAL PHYSICAL EXAMINATION EXCLUDING GYNECOLOGICAL EXAMINATION IN A PATIENT OLDER THAN 17 YEARS: Primary | ICD-10-CM

## 2019-08-27 RX ORDER — ONDANSETRON 4 MG/1
TABLET, ORALLY DISINTEGRATING ORAL
Refills: 0 | COMMUNITY
Start: 2019-06-23

## 2019-08-27 RX ORDER — PHENTERMINE HYDROCHLORIDE 37.5 MG/1
37.5 TABLET ORAL
Qty: 30 TAB | Refills: 0 | Status: SHIPPED | OUTPATIENT
Start: 2019-08-27

## 2019-08-27 RX ORDER — ACETAMINOPHEN 500 MG
2000 TABLET ORAL DAILY
Qty: 30 CAP | Refills: 11 | Status: SHIPPED | OUTPATIENT
Start: 2019-08-27

## 2019-08-27 NOTE — PROGRESS NOTES
HISTORY OF PRESENT ILLNESS  Lisa Pyle is a 37 y.o. female. HPI  Here today for CPE without gyn exam.   PMHx is significant for morbid obesity, vit D deficiency, gall stones. PSHx is significant for myomectomy (2009), tubal ligation. FHx is significant for hypertension, CKD. She has 2 children, aged 10 and 6. 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 Morbid Obesity  At her initial visit in July 2019, she reported that she was previously on [employer] Weight [employer] diet, where she learned a lot about how to eat. Her problem is with night snacking. It was too expensive for her, as it was $30/week. She started the program at 242 lbs and got down to 211 lbs but is now back up to 223 lbs. Her mother lives with her, she is raising 2 children, and her  works out of town frequently. She does not exercise much, as she doesn't have the time. She reports that she doesn't eat right all the time. She reports that sweets and chips are her kryptonite. She has an veronica to track her food intake, but doesn't use it much. Update 8/27/19: She started phentermine 37.5mg daily at her last visit in July 2019. She reports that it has been helping to curb her appetite, but has made a few mistakes along the way. Denies any side effects of the medication, including heart palpitations and insomnia. Has lost about 2 lbs since her last visit here in July 2019. She is using the elliptical about twice a week for about 30 minutes, mostly for stress relief. Her weight today is 221 lbs, with a BMI of 43.3. Current Specialists:  1. Dr. Darrian [place], OB/GYN - has appt upcoming in September 2019    Preventative Care  TDaP: April 2013  Pap smear: 2017, per patient report; has upcoming appt with GYN in Sept 2019  [facility]: 2017[facility] [place]  Colonoscopy: has never had  Denies family history of colon CA; occasional rectal bleeding with straining.      Healthy Habits  Patient is exercising 0x per week. Patient endorses healthy diet; avoids fatty/greasy foods, sugar-sweetened beverages. Denies tobacco use. Denies alcohol use. Denies illicit drug use. Sexually active with 1 male partner in last 12 months. Uses tubal ligation for contraception. Denies concerns for STIs. LMP 8/7/19. Lab Review (July 2019):  Normals: CMP, lipid panel, TSH  Abnormals: CBC (Hgb 11.0), vit D (17.7), vit B12 (1427)    History reviewed. No pertinent past medical history. Past Surgical History:   Procedure Laterality Date    HX OTHER SURGICAL      myomectomy 09    HX TUBAL LIGATION  2013    [facility]     Family History   Problem Relation Age of Onset    Hypertension Mother     Hypertension Father     Kidney Disease Father     Cancer Maternal Uncle         prostate    Hypertension Sister     Heart Disease Paternal Grandmother         MI    Hypertension Paternal Grandmother      Social History     Socioeconomic History    Marital status:      Spouse name: Not on file    Number of children: Not on file    Years of education: Not on file    Highest education level: Not on file   Occupational History    Occupation:      Employer: [employer]   Tobacco Use    Smoking status: Never Smoker    Smokeless tobacco: Never Used   Substance and Sexual Activity    Alcohol use: No    Drug use: No    Sexual activity: Yes     Partners: Male     Birth control/protection: None, Surgical     Comment:      Patient Active Problem List   Diagnosis Code    Vitamin D deficiency E55.9    Obesity, morbid ([facility] [address] E66.01     Outpatient Encounter Medications as of 8/27/2019   Medication Sig Dispense Refill    cholecalciferol (VITAMIN D3) 2,000 unit cap capsule Take 2,000 Units by mouth daily. 30 Cap 11    phentermine (ADIPEX-P) 37.5 mg tablet Take 1 Tab by mouth every morning. Max Daily Amount: 37.5 mg.  Indications: Weight Loss Management for an Obese Person 30 Tab 0    ondansetron (ZOFRAN ODT) 4 mg disintegrating tablet TAKE 1 TABLET BY MOUTH EVERY 8 HOURS AS NEEDED FOR NAUSEA  0    [DISCONTINUED] phentermine (ADIPEX-P) 37.5 mg tablet Take 1 Tab by mouth every morning. Max Daily Amount: 37.5 mg. Indications: Weight Loss Management for an Obese Person 30 Tab 0    omeprazole (PRILOSEC OTC) 20 mg tablet Take 1 Tab by mouth daily. (Patient taking differently: Take 20 mg by mouth daily as needed.) 20 Tab 0     No facility-administered encounter medications on file as of 8/27/2019.       Visit Vitals  /59 (BP 1 Location: Right arm, BP Patient Position: Sitting)   Pulse 75   Temp 98.7 °F (37.1 °C) (Oral)   Resp 16   Ht 5' (1.524 m)   Wt 221 lb 12.8 oz (100.6 kg)   LMP 08/07/2019 (Exact Date)   BMI 43.32 kg/m²         ROS  Constitutional: denies fevers, chills, fatigue, unintentional weight loss  Skin: denies rashes, itching, concerning/changing lesions  HENT: denies ear pain, hearing loss, sore throat, congestion  Eye: denies eye pain, blurred/double vision, eye discharge/redness  Cardiovascular: denies chest pain, palpitations, peripheral edema, orthopnea, claudication  Pulmonary: denies cough, shortness of breath, wheezing  Breast: denies breast lumps, pain; endorses occasional BSE  GI: denies heartburn, nausea, vomiting, diarrhea, constipation, rectal bleeding, abdominal pain  : denies dysuria, hematuria, vaginal discharge  MS: denies frequent/unexplained falls, persistent myalgias  Neuro: denies headaches, lightheadedness, numbness/tingling, seizures, sensory/strength changes  Psychiatry: denies depression, anxiety, insomnia, memory loss    Physical Exam    Vital Signs:   Visit Vitals  /59 (BP 1 Location: Right arm, BP Patient Position: Sitting)   Pulse 75   Temp 98.7 °F (37.1 °C) (Oral)   Resp 16   Ht 5' (1.524 m)   Wt 221 lb 12.8 oz (100.6 kg)   LMP 08/07/2019 (Exact Date)   BMI 43.32 kg/m²     Constitutional: alert, oriented, no acute distress; + morbidly obese, pleasant female  HENT: normocephalic, atraumatic; ears normal bilaterally; nose normal; oropharynx clear and moist  Eyes: PERRL, EOM intact, conjunctivae normal, no discharge  Neck: no thyromegaly, no cervical, submandibular, submental, occipital, supraclavicular lymphadenopathy  Cardiovascular: normal rate, regular rhythm, no murmurs noted; radial and pedal pulses normal bilaterally  Pulmonary: lungs clear to auscultation bilaterally with no wheezing, rhonchi, or increased work of breathing  Abdomen: soft, non-tender; + bowel sounds; no distension, masses, or organomegaly noted  MSK: normal gait, full ROM of all extremities  Neurological: alert, oriented, CN II - XII grossly intact; DTRs 2+ bilaterally  Skin: warm, dry, no rashes or suspicious lesions noted  Psych: mood/affect normal, behavior normal  Breast, pelvic, and rectal exams were deferred today as patient sees her gynecologist routinely and has no current complaints. ASSESSMENT and PLAN    ICD-10-CM ICD-9-CM    1. Encounter for annual physical examination excluding gynecological examination in a patient older than 17 years Z00.00 V70.0    2. Vitamin D deficiency E55.9 268.9 cholecalciferol (VITAMIN D3) 2,000 unit cap capsule   3. Obesity, morbid ([facility] [address] E66.01 278.01 phentermine (ADIPEX-P) 37.5 mg tablet   4. Encounter for medication monitoring Z51.81 V58.83 CBC W/O DIFF      HEPATIC FUNCTION PANEL     1. CPE - Normal CPE today; no breast/pelvic exams performed today as patient has upcoming appt with her GYN and has no complaints today. We reviewed her labs at length, and overall they look good. I suspect her slight Hgb drop at her last visit was due to her menses; will recheck Hgb today to ensure stability. 2. Morbid obesity - She has had minimal weight loss with 1 month of phentermine, but she wishes to continue. I again reiterated that this will be continued for a maximum of 3 months, and she agrees.  Will check LFTs today. Refill provided today, with goal of losing 5 lbs prior to next check-in. Due to impending maternity leave, will have her return in about 3 weeks for a weight recheck and repeat LFTs. 3. Vit D deficiency - Start 2000 units vit D once daily. Return in about 3 weeks for weight recheck, repeat LFTs, and med refill. Otherwise, return in 1 year for CPE. Follow-up and Dispositions    · Return in about 3 weeks (around 9/17/2019) for weight check and repeat LFTs.

## 2019-08-27 NOTE — PATIENT INSTRUCTIONS
Well Visit, Ages 25 to 48: Care Instructions  Your Care Instructions    Physical exams can help you stay healthy. Your doctor has checked your overall health and may have suggested ways to take good care of yourself. He or she also may have recommended tests. At home, you can help prevent illness with healthy eating, regular exercise, and other steps. Follow-up care is a key part of your treatment and safety. Be sure to make and go to all appointments, and call your doctor if you are having problems. It's also a good idea to know your test results and keep a list of the medicines you take. How can you care for yourself at home? · Reach and stay at a healthy weight. This will lower your risk for many problems, such as obesity, diabetes, heart disease, and high blood pressure. · Get at least 30 minutes of physical activity on most days of the week. Walking is a good choice. You also may want to do other activities, such as running, swimming, cycling, or playing tennis or team sports. Discuss any changes in your exercise program with your doctor. · Do not smoke or allow others to smoke around you. If you need help quitting, talk to your doctor about stop-smoking programs and medicines. These can increase your chances of quitting for good. · Talk to your doctor about whether you have any risk factors for sexually transmitted infections (STIs). Having one sex partner (who does not have STIs and does not have sex with anyone else) is a good way to avoid these infections. · Use birth control if you do not want to have children at this time. Talk with your doctor about the choices available and what might be best for you. · Protect your skin from too much sun. When you're outdoors from 10 a.m. to 4 p.m., stay in the shade or cover up with clothing and a hat with a wide brim. Wear sunglasses that block UV rays. Even when it's cloudy, put broad-spectrum sunscreen (SPF 30 or higher) on any exposed skin.   · See a dentist one or two times a year for checkups and to have your teeth cleaned. · Wear a seat belt in the car. Follow your doctor's advice about when to have certain tests. These tests can spot problems early. For everyone  · Cholesterol. Have the fat (cholesterol) in your blood tested after age 21. Your doctor will tell you how often to have this done based on your age, family history, or other things that can increase your risk for heart disease. · Blood pressure. Have your blood pressure checked during a routine doctor visit. Your doctor will tell you how often to check your blood pressure based on your age, your blood pressure results, and other factors. · Vision. Talk with your doctor about how often to have a glaucoma test.  · Diabetes. Ask your doctor whether you should have tests for diabetes. · Colon cancer. Your risk for colorectal cancer gets higher as you get older. Some experts say that adults should start regular screening at age 48 and stop at age 76. Others say to start before age 48 or continue after age 76. Talk with your doctor about your risk and when to start and stop screening. For women  · Breast exam and mammogram. Talk to your doctor about when you should have a clinical breast exam and a mammogram. Medical experts differ on whether and how often women under 50 should have these tests. Your doctor can help you decide what is right for you. · Cervical cancer screening test and pelvic exam. Begin with a Pap test at age 24. The test often is part of a pelvic exam. Starting at age 27, you may choose to have a Pap test, an HPV test, or both tests at the same time (called co-testing). Talk with your doctor about how often to have testing. · Tests for sexually transmitted infections (STIs). Ask whether you should have tests for STIs. You may be at risk if you have sex with more than one person, especially if your partners do not wear condoms.   For men  · Tests for sexually transmitted infections (STIs). Ask whether you should have tests for STIs. You may be at risk if you have sex with more than one person, especially if you do not wear a condom. · Testicular cancer exam. Ask your doctor whether you should check your testicles regularly. · Prostate exam. Talk to your doctor about whether you should have a blood test (called a PSA test) for prostate cancer. Experts differ on whether and when men should have this test. Some experts suggest it if you are older than 39 and are -American or have a father or brother who got prostate cancer when he was younger than 72. When should you call for help? Watch closely for changes in your health, and be sure to contact your doctor if you have any problems or symptoms that concern you. Where can you learn more? Go to http://chino-danyel.info/. Enter P072 in the search box to learn more about \"Well Visit, Ages 25 to 48: Care Instructions. \"  Current as of: December 13, 2018  Content Version: 12.1  © 3138-4180 [employer] Incorporated. Care instructions adapted under license by [employer] (which disclaims liability or warranty for this information). If you have questions about a medical condition or this instruction, always ask your healthcare professional. [place] [address] any warranty or liability for your use of this information.

## 2019-08-27 NOTE — PROGRESS NOTES
Chief Complaint   Patient presents with    Complete Physical     1. Have you been to the ER, urgent care clinic since your last visit? Hospitalized since your last visit? No    2. Have you seen or consulted any other health care providers outside of the [address] [place] [address] since your last visit? Include any pap smears or colon screening. No    Patient here for cpe.   Patient has GYN appointment in Sept.

## 2019-08-28 LAB
ALBUMIN SERPL-MCNC: 4.4 G/DL (ref 3.5–5.5)
ALP SERPL-CCNC: 72 IU/L (ref 39–117)
ALT SERPL-CCNC: 16 IU/L (ref 0–32)
AST SERPL-CCNC: 16 IU/L (ref 0–40)
BILIRUB DIRECT SERPL-MCNC: 0.1 MG/DL (ref 0–0.4)
BILIRUB SERPL-MCNC: 0.4 MG/DL (ref 0–1.2)
ERYTHROCYTE [DISTWIDTH] IN BLOOD BY AUTOMATED COUNT: 14.8 % (ref 12.3–15.4)
HCT VFR BLD AUTO: 37.8 % (ref 34–46.6)
HGB BLD-MCNC: 11.6 G/DL (ref 11.1–15.9)
MCH RBC QN AUTO: 27.2 PG (ref 26.6–33)
MCHC RBC AUTO-ENTMCNC: 30.7 G/DL (ref 31.5–35.7)
MCV RBC AUTO: 89 FL (ref 79–97)
PLATELET # BLD AUTO: 350 X10E3/UL (ref 150–450)
PROT SERPL-MCNC: 7.7 G/DL (ref 6–8.5)
RBC # BLD AUTO: 4.26 X10E6/UL (ref 3.77–5.28)
WBC # BLD AUTO: 6.8 X10E3/UL (ref 3.4–10.8)

## 2019-08-29 ENCOUNTER — TELEPHONE (OUTPATIENT)
Dept: FAMILY MEDICINE CLINIC | Age: 44
End: 2019-08-29

## 2019-08-29 NOTE — PROGRESS NOTES
Please let patient know that her Hgb has rebounded nicely, so no cause for concern. Liver enzymes are also normal, so she is okay to continue phentermine for another month. Thanks.   Zachary Cardona NP

## 2019-08-29 NOTE — TELEPHONE ENCOUNTER
----- Message from Kameron Gill NP sent at 8/29/2019  8:50 AM EDT -----  Please let patient know that her Hgb has rebounded nicely, so no cause for concern. Liver enzymes are also normal, so she is okay to continue phentermine for another month. Thanks.   Kameron Gill NP

## 2021-11-09 ENCOUNTER — TRANSCRIBE ORDER (OUTPATIENT)
Dept: SCHEDULING | Age: 46
End: 2021-11-09

## 2021-11-09 DIAGNOSIS — D25.9 FIBROID, UTERINE: Primary | ICD-10-CM

## 2021-11-29 ENCOUNTER — HOSPITAL ENCOUNTER (OUTPATIENT)
Dept: MRI IMAGING | Age: 46
Discharge: HOME | End: 2021-11-29
Attending: OBSTETRICS & GYNECOLOGY
Payer: COMMERCIAL

## 2021-11-29 DIAGNOSIS — D25.9 FIBROID, UTERINE: ICD-10-CM

## 2021-11-29 PROCEDURE — A9576 INJ PROHANCE MULTIPACK: HCPCS

## 2021-11-29 PROCEDURE — 72197 MRI PELVIS W/O & W/DYE: CPT

## 2021-11-29 PROCEDURE — 74011250636 HC RX REV CODE- 250/636

## 2021-11-29 RX ADMIN — GADOTERIDOL 20 ML: 279.3 INJECTION, SOLUTION INTRAVENOUS at 16:04

## 2022-03-19 PROBLEM — E66.01 OBESITY, MORBID (HCC): Status: ACTIVE | Noted: 2019-07-15

## 2023-04-23 DIAGNOSIS — Z12.31 VISIT FOR SCREENING MAMMOGRAM: Primary | ICD-10-CM

## 2023-06-01 ENCOUNTER — OFFICE VISIT (OUTPATIENT)
Age: 48
End: 2023-06-01
Payer: COMMERCIAL

## 2023-06-01 VITALS
HEIGHT: 60 IN | OXYGEN SATURATION: 100 % | HEART RATE: 84 BPM | WEIGHT: 243 LBS | RESPIRATION RATE: 17 BRPM | DIASTOLIC BLOOD PRESSURE: 79 MMHG | SYSTOLIC BLOOD PRESSURE: 116 MMHG | BODY MASS INDEX: 47.71 KG/M2 | TEMPERATURE: 98.4 F

## 2023-06-01 DIAGNOSIS — Z12.11 COLON CANCER SCREENING: ICD-10-CM

## 2023-06-01 DIAGNOSIS — E78.2 MIXED HYPERLIPIDEMIA: ICD-10-CM

## 2023-06-01 DIAGNOSIS — Z12.31 ENCOUNTER FOR SCREENING MAMMOGRAM FOR MALIGNANT NEOPLASM OF BREAST: ICD-10-CM

## 2023-06-01 DIAGNOSIS — Z00.00 ANNUAL PHYSICAL EXAM: Primary | ICD-10-CM

## 2023-06-01 DIAGNOSIS — Z13.1 DIABETES MELLITUS SCREENING: ICD-10-CM

## 2023-06-01 DIAGNOSIS — Z76.89 ENCOUNTER FOR WEIGHT MANAGEMENT: ICD-10-CM

## 2023-06-01 DIAGNOSIS — R79.89 LOW VITAMIN D LEVEL: ICD-10-CM

## 2023-06-01 PROCEDURE — 99386 PREV VISIT NEW AGE 40-64: CPT | Performed by: STUDENT IN AN ORGANIZED HEALTH CARE EDUCATION/TRAINING PROGRAM

## 2023-06-01 PROCEDURE — 99203 OFFICE O/P NEW LOW 30 MIN: CPT | Performed by: STUDENT IN AN ORGANIZED HEALTH CARE EDUCATION/TRAINING PROGRAM

## 2023-06-01 RX ORDER — PHENTERMINE HYDROCHLORIDE 37.5 MG/1
37.5 CAPSULE ORAL EVERY MORNING
Qty: 30 CAPSULE | Refills: 2 | Status: SHIPPED | OUTPATIENT
Start: 2023-06-01 | End: 2023-08-30

## 2023-06-01 SDOH — HEALTH STABILITY: PHYSICAL HEALTH: ON AVERAGE, HOW MANY MINUTES DO YOU ENGAGE IN EXERCISE AT THIS LEVEL?: 50 MIN

## 2023-06-01 SDOH — ECONOMIC STABILITY: INCOME INSECURITY: HOW HARD IS IT FOR YOU TO PAY FOR THE VERY BASICS LIKE FOOD, HOUSING, MEDICAL CARE, AND HEATING?: NOT HARD AT ALL

## 2023-06-01 SDOH — HEALTH STABILITY: PHYSICAL HEALTH: ON AVERAGE, HOW MANY DAYS PER WEEK DO YOU ENGAGE IN MODERATE TO STRENUOUS EXERCISE (LIKE A BRISK WALK)?: 2 DAYS

## 2023-06-01 SDOH — ECONOMIC STABILITY: HOUSING INSECURITY: IN THE LAST 12 MONTHS, HOW MANY PLACES HAVE YOU LIVED?: 0

## 2023-06-01 SDOH — ECONOMIC STABILITY: FOOD INSECURITY: WITHIN THE PAST 12 MONTHS, YOU WORRIED THAT YOUR FOOD WOULD RUN OUT BEFORE YOU GOT MONEY TO BUY MORE.: NEVER TRUE

## 2023-06-01 SDOH — ECONOMIC STABILITY: TRANSPORTATION INSECURITY
IN THE PAST 12 MONTHS, HAS THE LACK OF TRANSPORTATION KEPT YOU FROM MEDICAL APPOINTMENTS OR FROM GETTING MEDICATIONS?: NO

## 2023-06-01 SDOH — ECONOMIC STABILITY: HOUSING INSECURITY
IN THE LAST 12 MONTHS, WAS THERE A TIME WHEN YOU DID NOT HAVE A STEADY PLACE TO SLEEP OR SLEPT IN A SHELTER (INCLUDING NOW)?: NO

## 2023-06-01 SDOH — ECONOMIC STABILITY: FOOD INSECURITY: WITHIN THE PAST 12 MONTHS, THE FOOD YOU BOUGHT JUST DIDN'T LAST AND YOU DIDN'T HAVE MONEY TO GET MORE.: NEVER TRUE

## 2023-06-01 SDOH — ECONOMIC STABILITY: INCOME INSECURITY: IN THE LAST 12 MONTHS, WAS THERE A TIME WHEN YOU WERE NOT ABLE TO PAY THE MORTGAGE OR RENT ON TIME?: NO

## 2023-06-01 ASSESSMENT — SOCIAL DETERMINANTS OF HEALTH (SDOH)
HOW OFTEN DO YOU ATTENT MEETINGS OF THE CLUB OR ORGANIZATION YOU BELONG TO?: MORE THAN 4 TIMES PER YEAR
WITHIN THE LAST YEAR, HAVE YOU BEEN KICKED, HIT, SLAPPED, OR OTHERWISE PHYSICALLY HURT BY YOUR PARTNER OR EX-PARTNER?: NO
HOW OFTEN DO YOU GET TOGETHER WITH FRIENDS OR RELATIVES?: THREE TIMES A WEEK
DO YOU BELONG TO ANY CLUBS OR ORGANIZATIONS SUCH AS CHURCH GROUPS UNIONS, FRATERNAL OR ATHLETIC GROUPS, OR SCHOOL GROUPS?: YES
WITHIN THE LAST YEAR, HAVE YOU BEEN HUMILIATED OR EMOTIONALLY ABUSED IN OTHER WAYS BY YOUR PARTNER OR EX-PARTNER?: NO
HOW OFTEN DO YOU ATTEND CHURCH OR RELIGIOUS SERVICES?: MORE THAN 4 TIMES PER YEAR
IN A TYPICAL WEEK, HOW MANY TIMES DO YOU TALK ON THE PHONE WITH FAMILY, FRIENDS, OR NEIGHBORS?: MORE THAN THREE TIMES A WEEK
WITHIN THE LAST YEAR, HAVE YOU BEEN AFRAID OF YOUR PARTNER OR EX-PARTNER?: NO
WITHIN THE LAST YEAR, HAVE TO BEEN RAPED OR FORCED TO HAVE ANY KIND OF SEXUAL ACTIVITY BY YOUR PARTNER OR EX-PARTNER?: NO

## 2023-06-01 ASSESSMENT — LIFESTYLE VARIABLES
HOW OFTEN DO YOU HAVE A DRINK CONTAINING ALCOHOL: NEVER
HOW MANY STANDARD DRINKS CONTAINING ALCOHOL DO YOU HAVE ON A TYPICAL DAY: PATIENT DOES NOT DRINK

## 2023-06-01 ASSESSMENT — PATIENT HEALTH QUESTIONNAIRE - PHQ9
SUM OF ALL RESPONSES TO PHQ QUESTIONS 1-9: 0
SUM OF ALL RESPONSES TO PHQ9 QUESTIONS 1 & 2: 0
1. LITTLE INTEREST OR PLEASURE IN DOING THINGS: 0
2. FEELING DOWN, DEPRESSED OR HOPELESS: 0
SUM OF ALL RESPONSES TO PHQ QUESTIONS 1-9: 0

## 2023-06-01 NOTE — PROGRESS NOTES
Name and Date of Birth Verified    Pharmacy Verified    Chief Complaint   Patient presents with    New Patient    Establish Care     Patient fasting for labs. [facility]. Order for Mammogram.    Will make an appt. GYN [place] Yancey. 1. \"Have you been to the ER, urgent care clinic since your last visit? Hospitalized since your last visit? \" No    2. \"Have you seen or consulted any other health care providers outside of the [address] [place] [address] since your last visit? \" No     3. For patients aged 39-70: Has the patient had a colonoscopy / FIT/ Cologuard? No      If the patient is female:    4. For patients aged 41-77: Has the patient had a mammogram within the past 2 years? Yes 2021      5. For patients aged 21-65: Has the patient had a pap smear?  No 11/2021     Health Maintenance Due   Topic Date Due    COVID-19 Vaccine (1) Never done    Depression Screen  Never done    Hepatitis C screen  Never done    Cervical cancer screen  Never done    Colorectal Cancer Screen  Never done    DTaP/Tdap/Td vaccine (3 - Td or Tdap) 04/01/2023

## 2023-06-01 NOTE — PROGRESS NOTES
[facility]  [address] [place], [address] [place] [address]  [phone]    HPI:  Get Neri is a 50 y.o. female. Presenting for her annual checkup. Acute/chronic concerns:  Weight management:  Pt states that she struggled with weight gain throughout her entire life. Would like medical weight loss management. Diet is up and down. Previously on phentermine which was well tolerated and worked well. Was also going through weight watchers. 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 OB/GYN Hx: Follows with Dr. Rose Mary Shields Maintenance:    Health Maintenance Due   Topic Date Due    COVID-19 Vaccine (1) Never done    Depression Screen  Never done    Hepatitis C screen  Never done    Cervical cancer screen  Never done    Diabetes screen  Never done    Colorectal Cancer Screen  Never done    DTaP/Tdap/Td vaccine (3 - Td or Tdap) 04/01/2023       Preventive Screenings:    No flowsheet data found. Current Outpatient Medications   Medication Sig Dispense Refill    phentermine 37.5 MG capsule Take 1 capsule by mouth every morning for 90 days. Max Daily Amount: 37.5 mg 30 capsule 2    Cholecalciferol 50 MCG (2000 UT) CAPS Take 2,000 Units by mouth daily (Patient not taking: Reported on 6/1/2023)      omeprazole (PRILOSEC OTC) 20 MG tablet Take 20 mg by mouth daily (Patient not taking: Reported on 6/1/2023)      ondansetron (ZOFRAN-ODT) 4 MG disintegrating tablet TAKE 1 TABLET BY MOUTH EVERY 8 HOURS AS NEEDED FOR NAUSEA (Patient not taking: Reported on 6/1/2023)       No current facility-administered medications for this visit. Allergies:   Patient has no known allergies.      Social History     Socioeconomic History    Marital status:      Spouse name: Not on file    Number of children: Not on file    Years of education: Not on file    Highest

## 2023-06-02 LAB
25(OH)D3 SERPL-MCNC: 20.7 NG/ML (ref 30–100)
ALBUMIN SERPL-MCNC: 3.7 G/DL (ref 3.5–5)
ALBUMIN/GLOB SERPL: 1 (ref 1.1–2.2)
ALP SERPL-CCNC: 76 U/L (ref 45–117)
ALT SERPL-CCNC: 24 U/L (ref 12–78)
ANION GAP SERPL CALC-SCNC: 6 MMOL/L (ref 5–15)
AST SERPL-CCNC: 11 U/L (ref 15–37)
BILIRUB SERPL-MCNC: 0.3 MG/DL (ref 0.2–1)
BUN SERPL-MCNC: 11 MG/DL (ref 6–20)
BUN/CREAT SERPL: 14 (ref 12–20)
CALCIUM SERPL-MCNC: 9.5 MG/DL (ref 8.5–10.1)
CHLORIDE SERPL-SCNC: 107 MMOL/L (ref 97–108)
CHOLEST SERPL-MCNC: 168 MG/DL
CO2 SERPL-SCNC: 26 MMOL/L (ref 21–32)
CREAT SERPL-MCNC: 0.76 MG/DL (ref 0.55–1.02)
EST. AVERAGE GLUCOSE BLD GHB EST-MCNC: 117 MG/DL
GLOBULIN SER CALC-MCNC: 3.8 G/DL (ref 2–4)
GLUCOSE SERPL-MCNC: 90 MG/DL (ref 65–100)
HBA1C MFR BLD: 5.7 % (ref 4–5.6)
HDLC SERPL-MCNC: 64 MG/DL
HDLC SERPL: 2.6 (ref 0–5)
LDLC SERPL CALC-MCNC: 94 MG/DL (ref 0–100)
POTASSIUM SERPL-SCNC: 4.2 MMOL/L (ref 3.5–5.1)
PROT SERPL-MCNC: 7.5 G/DL (ref 6.4–8.2)
SODIUM SERPL-SCNC: 139 MMOL/L (ref 136–145)
TRIGL SERPL-MCNC: 50 MG/DL
TSH SERPL DL<=0.05 MIU/L-ACNC: 1 UIU/ML (ref 0.36–3.74)
VLDLC SERPL CALC-MCNC: 10 MG/DL

## 2023-07-03 ENCOUNTER — HOSPITAL ENCOUNTER (OUTPATIENT)
Facility: HOSPITAL | Age: 48
Discharge: HOME | End: 2023-07-06
Payer: COMMERCIAL

## 2023-07-03 DIAGNOSIS — Z12.31 ENCOUNTER FOR SCREENING MAMMOGRAM FOR MALIGNANT NEOPLASM OF BREAST: ICD-10-CM

## 2023-07-03 PROCEDURE — 77063 BREAST TOMOSYNTHESIS BI: CPT

## 2023-09-05 ENCOUNTER — OFFICE VISIT (OUTPATIENT)
Age: 48
End: 2023-09-05
Payer: COMMERCIAL

## 2023-09-05 VITALS
BODY MASS INDEX: 47.12 KG/M2 | RESPIRATION RATE: 17 BRPM | SYSTOLIC BLOOD PRESSURE: 118 MMHG | TEMPERATURE: 98.3 F | WEIGHT: 240 LBS | OXYGEN SATURATION: 97 % | DIASTOLIC BLOOD PRESSURE: 76 MMHG | HEART RATE: 69 BPM | HEIGHT: 60 IN

## 2023-09-05 DIAGNOSIS — E66.01 CLASS 3 SEVERE OBESITY DUE TO EXCESS CALORIES WITH SERIOUS COMORBIDITY AND BODY MASS INDEX (BMI) OF 45.0 TO 49.9 IN ADULT (HCC): Primary | ICD-10-CM

## 2023-09-05 PROCEDURE — 99213 OFFICE O/P EST LOW 20 MIN: CPT | Performed by: STUDENT IN AN ORGANIZED HEALTH CARE EDUCATION/TRAINING PROGRAM

## 2023-09-05 RX ORDER — PHENTERMINE HYDROCHLORIDE 37.5 MG/1
37.5 CAPSULE ORAL EVERY MORNING
COMMUNITY
End: 2023-09-05 | Stop reason: SDUPTHER

## 2023-09-05 RX ORDER — PHENTERMINE HYDROCHLORIDE 37.5 MG/1
37.5 CAPSULE ORAL EVERY MORNING
Qty: 30 CAPSULE | Refills: 2
Start: 2023-09-05 | End: 2023-12-04

## 2023-09-05 NOTE — PROGRESS NOTES
Name and Date of Birth Verified    Pharmacy Verified    Chief Complaint   Patient presents with    Follow-up     Follow up: 3 months for weight management 6/1/2023       1. \"Have you been to the ER, urgent care clinic since your last visit? Hospitalized since your last visit? \" No    2. \"Have you seen or consulted any other health care providers outside of the [address] [place] [address] since your last visit? \" No     3. For patients aged 43-73: Has the patient had a colonoscopy / FIT/ Cologuard? No      If the patient is female:    4. For patients aged 43-66: Has the patient had a mammogram within the past 2 years? Yes 7/3/2023      5. For patients aged 21-65: Has the patient had a pap smear?      Phong Samson has been 2 years    Health Maintenance Due   Topic Date Due    COVID-19 Vaccine (1) Never done    Hepatitis C screen  Never done    Cervical cancer screen  Never done    Colorectal Cancer Screen  Never done    DTaP/Tdap/Td vaccine (3 - Td or Tdap) 04/01/2023    Flu vaccine (1) Never done

## 2023-11-08 DIAGNOSIS — E66.01 CLASS 3 SEVERE OBESITY DUE TO EXCESS CALORIES WITH SERIOUS COMORBIDITY AND BODY MASS INDEX (BMI) OF 45.0 TO 49.9 IN ADULT (HCC): ICD-10-CM

## 2023-11-09 NOTE — TELEPHONE ENCOUNTER
Last appointment: 9/5/23  Next appointment: 12/8/23  Previous refill encounter(s): 6/1/23 #30 with 2 refills    Requested Prescriptions     Pending Prescriptions Disp Refills    phentermine 37.5 MG capsule 30 capsule 0     Sig: Take 1 capsule by mouth every morning for 30 days. Max Daily Amount: 37.5 mg         For Pharmacy Admin Tracking Only    Program: Medication Refill  CPA in place:    Recommendation Provided To:    Intervention Detail: New Rx: 1, reason: Patient Preference  Intervention Accepted By:   Charli Santana Closed?:    Time Spent (min): 5

## 2023-11-10 RX ORDER — PHENTERMINE HYDROCHLORIDE 37.5 MG/1
37.5 CAPSULE ORAL EVERY MORNING
Qty: 30 CAPSULE | Refills: 0 | Status: SHIPPED | OUTPATIENT
Start: 2023-11-10 | End: 2023-12-10

## 2024-01-29 ENCOUNTER — OFFICE VISIT (OUTPATIENT)
Age: 49
End: 2024-01-29
Payer: COMMERCIAL

## 2024-01-29 VITALS
SYSTOLIC BLOOD PRESSURE: 116 MMHG | HEIGHT: 60 IN | DIASTOLIC BLOOD PRESSURE: 72 MMHG | TEMPERATURE: 98.4 F | RESPIRATION RATE: 17 BRPM | BODY MASS INDEX: 47.18 KG/M2 | WEIGHT: 240.3 LBS | HEART RATE: 79 BPM

## 2024-01-29 DIAGNOSIS — E66.01 CLASS 3 SEVERE OBESITY DUE TO EXCESS CALORIES WITHOUT SERIOUS COMORBIDITY WITH BODY MASS INDEX (BMI) OF 45.0 TO 49.9 IN ADULT (HCC): Primary | ICD-10-CM

## 2024-01-29 PROCEDURE — 99213 OFFICE O/P EST LOW 20 MIN: CPT | Performed by: STUDENT IN AN ORGANIZED HEALTH CARE EDUCATION/TRAINING PROGRAM

## 2024-01-29 RX ORDER — MELOXICAM 15 MG/1
15 TABLET ORAL DAILY
COMMUNITY
Start: 2024-01-03

## 2024-01-29 RX ORDER — PHENTERMINE HYDROCHLORIDE 37.5 MG/1
37.5 CAPSULE ORAL EVERY MORNING
Qty: 30 CAPSULE | Refills: 2 | Status: SHIPPED | OUTPATIENT
Start: 2024-01-29 | End: 2024-04-28

## 2024-01-29 RX ORDER — PHENTERMINE HYDROCHLORIDE 37.5 MG/1
37.5 CAPSULE ORAL EVERY MORNING
COMMUNITY
End: 2024-01-29

## 2024-01-29 ASSESSMENT — PATIENT HEALTH QUESTIONNAIRE - PHQ9
SUM OF ALL RESPONSES TO PHQ QUESTIONS 1-9: 0
1. LITTLE INTEREST OR PLEASURE IN DOING THINGS: 0
2. FEELING DOWN, DEPRESSED OR HOPELESS: 0
SUM OF ALL RESPONSES TO PHQ9 QUESTIONS 1 & 2: 0
SUM OF ALL RESPONSES TO PHQ QUESTIONS 1-9: 0

## 2024-01-29 NOTE — PROGRESS NOTES
[facility]  [address] [place] [address] [phone]    Chief Complaint: Weight management    Subjective  Mary Qureshi is a 48 y.o. Black /  female , established patient, here for evaluation of the concern(s) above;    Weight management:  Pt states that she struggled with weight gain throughout her entire life.  She did stop taking Phentermine about a month ago for unclear reasons after taking for 2 months.  Diet is not at best.  Previously on phentermine which was well tolerated and worked well.  Was also going through weight watchers but no longer goes.     General Health Habits:  Smoking Hx: no   Alcohol Use: no  Occupation: An Executive at a [employer]   and has a 10 yo boy and 13 yo girl.       Allergies - reviewed:   No Known Allergies    Past Medical History - reviewed:  No past medical history on file.    Depression screening:  PHQ-9 Total Score: 0 (1/29/2024  4:16 PM)      Review of systems:   A comprehensive review of systems was negative except for that written in the History of Present Illness.     Physical Exam  /72 (Site: Left Upper Arm, Position: Sitting, Cuff Size: Medium Adult)   Pulse 79   Temp 98.4 °F (36.9 °C) (Tympanic)   Resp 17   Ht 1.511 m (4' 11.5\")   Wt 109 kg (240 lb 4.8 oz)   LMP 01/29/2024   BMI 47.72 kg/m²     General: Alert and oriented, in no acute distress.  SKIN: No rash. No suspicious lesions or moles.  EYE: PERRL. Sclera and conjuctival clear. Extraocular movements intact.  EARS: External normal, canals clear, tympanic membranes normal.   NOSE: Mucosa healthy without drainage or ulceration.  OROPHARYNX: No suspicious lesions, normal dentition, pharynx, tongue and tonsils normal.  NECK: Supple; no masses; thyroid normal.  LYMPH NODES: No significant cervial lymphadenopathy.  LUNGS: Respirations unlabored; clear to auscultation bilaterally.  CARDIOVASCULAR: Regular, rate, and

## 2024-01-29 NOTE — PROGRESS NOTES
Chief Complaint   Patient presents with    Follow-up     2023 Weight Management        Patient has stopped taking phentermine 1 month ago.    \"Have you been to the ER, urgent care clinic since your last visit?  Hospitalized since your last visit?\"    NO    “Have you seen or consulted any other health care providers outside of [facility] since your last visit?”      Yes Dr. Dockery [facility] 2023        There were no vitals filed for this visit.  Health Maintenance Due   Topic Date Due    Hepatitis B vaccine (1 of 3 - 3-dose series) Never done    Hepatitis C screen  Never done    Cervical cancer screen  Never done    Colorectal Cancer Screen  Never done    DTaP/Tdap/Td vaccine (3 - Td or Tdap) 2023    Flu vaccine (1) Never done    COVID-19 Vaccine ( - - season) 2023    Shingles vaccine (1 of 2) Never done      The patient, Mary Qureshi, identity was verified by name and , pharmacy verified  Labs:Yes  Fasting:No

## 2024-04-29 ENCOUNTER — TELEPHONE (OUTPATIENT)
Age: 49
End: 2024-04-29

## 2024-04-29 NOTE — TELEPHONE ENCOUNTER
----- Message from Sofie Smalls sent at 4/29/2024  8:47 AM EDT -----  Subject: Message to Provider    QUESTIONS  Information for Provider? PT would like to know if PCP is accepting new pt   and would accept her son Erik Qureshi. Please call back to advise.   ---------------------------------------------------------------------------  --------------  CALL BACK INFO  [phone]; OK to leave message on voicemail  ---------------------------------------------------------------------------  --------------  SCRIPT ANSWERS  Relationship to Patient? Self

## 2024-05-01 NOTE — TELEPHONE ENCOUNTER
Patient requesting a call back to see if Dr. Linares will accept her son who is 11 years old . Please call  at [phone].

## 2024-05-24 ENCOUNTER — OFFICE VISIT (OUTPATIENT)
Age: 49
End: 2024-05-24
Payer: COMMERCIAL

## 2024-05-24 VITALS
DIASTOLIC BLOOD PRESSURE: 80 MMHG | OXYGEN SATURATION: 98 % | SYSTOLIC BLOOD PRESSURE: 126 MMHG | WEIGHT: 251 LBS | TEMPERATURE: 97.8 F | BODY MASS INDEX: 49.85 KG/M2 | RESPIRATION RATE: 18 BRPM | HEART RATE: 78 BPM

## 2024-05-24 DIAGNOSIS — E66.01 OBESITY, MORBID (HCC): Primary | ICD-10-CM

## 2024-05-24 PROCEDURE — 99213 OFFICE O/P EST LOW 20 MIN: CPT | Performed by: FAMILY MEDICINE

## 2024-05-24 RX ORDER — TIRZEPATIDE 2.5 MG/.5ML
2.5 INJECTION, SOLUTION SUBCUTANEOUS
Qty: 0.5 ML | Refills: 1 | Status: SHIPPED | OUTPATIENT
Start: 2024-05-24

## 2024-05-24 ASSESSMENT — PATIENT HEALTH QUESTIONNAIRE - PHQ9
SUM OF ALL RESPONSES TO PHQ QUESTIONS 1-9: 0
2. FEELING DOWN, DEPRESSED OR HOPELESS: NOT AT ALL
SUM OF ALL RESPONSES TO PHQ QUESTIONS 1-9: 0
1. LITTLE INTEREST OR PLEASURE IN DOING THINGS: NOT AT ALL
SUM OF ALL RESPONSES TO PHQ9 QUESTIONS 1 & 2: 0
SUM OF ALL RESPONSES TO PHQ QUESTIONS 1-9: 0
SUM OF ALL RESPONSES TO PHQ QUESTIONS 1-9: 0

## 2024-05-24 NOTE — PROGRESS NOTES
Chief Complaint   Patient presents with    Weight Management     Follow up        \"Have you been to the ER, urgent care clinic since your last visit?  Hospitalized since your last visit?\"    NO    “Have you seen or consulted any other health care providers outside of [facility] since your last visit?”    NO    “Have you had a colorectal cancer screening such as a colonoscopy/FIT/Cologuard?    NO    No colonoscopy on file  No cologuard on file  No FIT/FOBT on file   No flexible sigmoidoscopy on file         “Have you had a pap smear?”    NO    No cervical cancer screening on file          Vitals:    24 1029   BP: 126/80   Pulse: 78   Resp: 18   Temp: 97.8 °F (36.6 °C)   TempSrc: Infrared   SpO2: 98%   Weight: 113.9 kg (251 lb)        Health Maintenance Due   Topic Date Due    Cervical cancer screen  Never done    Colorectal Cancer Screen  Never done    A1C test (Diabetic or Prediabetic)  2024        The patient, Mary Qureshi, identity was verified by name and

## 2024-05-28 ENCOUNTER — CLINICAL DOCUMENTATION (OUTPATIENT)
Age: 49
End: 2024-05-28

## 2024-08-05 ENCOUNTER — OFFICE VISIT (OUTPATIENT)
Age: 49
End: 2024-08-05
Payer: COMMERCIAL

## 2024-08-05 ENCOUNTER — TELEPHONE (OUTPATIENT)
Age: 49
End: 2024-08-05

## 2024-08-05 VITALS
SYSTOLIC BLOOD PRESSURE: 122 MMHG | TEMPERATURE: 97.8 F | RESPIRATION RATE: 17 BRPM | BODY MASS INDEX: 49.08 KG/M2 | WEIGHT: 250 LBS | HEART RATE: 58 BPM | HEIGHT: 60 IN | DIASTOLIC BLOOD PRESSURE: 78 MMHG | OXYGEN SATURATION: 99 %

## 2024-08-05 DIAGNOSIS — E66.01 CLASS 3 OBESITY (HCC): ICD-10-CM

## 2024-08-05 DIAGNOSIS — M54.40 CHRONIC BILATERAL LOW BACK PAIN WITH SCIATICA, SCIATICA LATERALITY UNSPECIFIED: ICD-10-CM

## 2024-08-05 DIAGNOSIS — Z12.11 COLON CANCER SCREENING: ICD-10-CM

## 2024-08-05 DIAGNOSIS — K80.20 CALCULUS OF GALLBLADDER WITHOUT CHOLECYSTITIS WITHOUT OBSTRUCTION: ICD-10-CM

## 2024-08-05 DIAGNOSIS — G89.29 CHRONIC BILATERAL LOW BACK PAIN WITH SCIATICA, SCIATICA LATERALITY UNSPECIFIED: ICD-10-CM

## 2024-08-05 DIAGNOSIS — R73.03 PREDIABETES: Primary | ICD-10-CM

## 2024-08-05 DIAGNOSIS — E55.9 VITAMIN D DEFICIENCY: ICD-10-CM

## 2024-08-05 PROBLEM — E66.813 CLASS 3 OBESITY: Status: ACTIVE | Noted: 2019-07-15

## 2024-08-05 PROCEDURE — 99214 OFFICE O/P EST MOD 30 MIN: CPT | Performed by: STUDENT IN AN ORGANIZED HEALTH CARE EDUCATION/TRAINING PROGRAM

## 2024-08-05 RX ORDER — LIDOCAINE 50 MG/G
1 PATCH TOPICAL DAILY
Qty: 10 PATCH | Refills: 0 | Status: SHIPPED | OUTPATIENT
Start: 2024-08-05 | End: 2024-08-15

## 2024-08-05 RX ORDER — PHENTERMINE HYDROCHLORIDE 37.5 MG/1
37.5 TABLET ORAL
Qty: 30 TABLET | Refills: 2 | Status: SHIPPED | OUTPATIENT
Start: 2024-08-05 | End: 2024-11-03

## 2024-08-05 RX ORDER — TIZANIDINE 4 MG/1
4 TABLET ORAL NIGHTLY PRN
Qty: 90 TABLET | Refills: 0 | Status: SHIPPED | OUTPATIENT
Start: 2024-08-05

## 2024-08-05 SDOH — ECONOMIC STABILITY: FOOD INSECURITY: WITHIN THE PAST 12 MONTHS, YOU WORRIED THAT YOUR FOOD WOULD RUN OUT BEFORE YOU GOT MONEY TO BUY MORE.: NEVER TRUE

## 2024-08-05 SDOH — ECONOMIC STABILITY: TRANSPORTATION INSECURITY
IN THE PAST 12 MONTHS, HAS LACK OF TRANSPORTATION KEPT YOU FROM MEETINGS, WORK, OR FROM GETTING THINGS NEEDED FOR DAILY LIVING?: NO

## 2024-08-05 SDOH — ECONOMIC STABILITY: INCOME INSECURITY: HOW HARD IS IT FOR YOU TO PAY FOR THE VERY BASICS LIKE FOOD, HOUSING, MEDICAL CARE, AND HEATING?: NOT HARD AT ALL

## 2024-08-05 SDOH — ECONOMIC STABILITY: FOOD INSECURITY: WITHIN THE PAST 12 MONTHS, THE FOOD YOU BOUGHT JUST DIDN'T LAST AND YOU DIDN'T HAVE MONEY TO GET MORE.: NEVER TRUE

## 2024-08-05 NOTE — ASSESSMENT & PLAN NOTE
-Discussed lifestyle changes; the importance of eating habits and physical activity (5 days weekly for 30 mins/day minimally or as tolerated).  -Also discussed the importance to avoid smoking and excessive alcohol consumption.  - Encouraged to eat foods that are baked, broiled, boiled, steamed or grilled.  Avoid greasy or fried foods.  Use olive oil or canola oil instead of vegetable oil.  Eat fish twice weekly.  -Have at an average of 7 hours of uninterrupted sleep at night.

## 2024-08-05 NOTE — PROGRESS NOTES
Chief Complaint   Patient presents with    6 Month Follow-Up     2024 Class 3 severe obesity due to excess calories without serious comorbidity with body mass index (BMI) of 45.0 to 49.9 in adult     The patient, Mary Qureshi, identity was verified by name and .     Patient fasting for labs, [facility].    Patient stated that she stopped taking Zepbound 2 weeks ago due to Gallbladder issue. Also not consistent with having it in stock.    \"Have you been to the ER, urgent care clinic since your last visit?  Hospitalized since your last visit?\"    NO    “Have you seen or consulted any other health care providers outside of [facility] since your last visit?”      Yes  Tele Doc  2024  Back Pain     “Have you had a pap smear?”      Yes 2024 Dr. [facility]      “Have you had a colorectal cancer screening such as a colonoscopy/FIT/Cologuard?    NO    Requesting referral for Colonoscopy.    Vitals:    24 0943   BP: 122/78   Pulse: 58   Resp: 17   Temp: 97.8 °F (36.6 °C)   SpO2: 99%      Health Maintenance Due   Topic Date Due    Cervical cancer screen  Never done    Colorectal Cancer Screen  Never done    A1C test (Diabetic or Prediabetic)  2024    Flu vaccine (1) Never done          
fried foods.  Use olive oil or canola oil instead of vegetable oil.  Eat fish twice weekly.  -Have at an average of 7 hours of uninterrupted sleep at night.     Orders:  -     Amb Referral to Nutrition Services  -     phentermine (ADIPEX-P) 37.5 MG tablet; Take 1 tablet by mouth every morning (before breakfast) for 90 days. Max Daily Amount: 37.5 mg, Disp-30 tablet, R-2Normal  6. Class 3 obesity (HCC)  -     Amb Referral to Nutrition Services  -     phentermine (ADIPEX-P) 37.5 MG tablet; Take 1 tablet by mouth every morning (before breakfast) for 90 days. Max Daily Amount: 37.5 mg, Disp-30 tablet, R-2Normal  7. Colon cancer screening  -     [facility] - Karen Barnhart MD, Gastroenterology, [place]      Follow up: 3 months. RTC to clinic sooner should symptoms persist, worsen or fail to improve as anticipated.    We discussed the expected course, resolution and complications of the diagnosis(es) in detail.  Medication risks, benefits, costs, interactions, and alternatives were discussed as indicated.  I advised to contact the office if condition worsens, changes or fails to improve as anticipated. Pt expressed understanding with the diagnosis(es) and plan. Patient understands that this encounter was a temporary measure, and the importance of further follow up and examination was emphasized.  Patient verbalized understanding.      Signed By: Casey Linares MD     August 5, 2024

## 2024-08-05 NOTE — TELEPHONE ENCOUNTER
Attempted to call pt to schedule NP appt with Dr. Chanel,  full    NP, gallbladder, Casey Linares, [employer], notes in CC

## 2024-08-05 NOTE — ASSESSMENT & PLAN NOTE
We did discuss that we could continue to seek out nonoperative modalities, such as: NSAIDs, oral and topical analgesics, joint injections, physical therapy, stretching, strengthening, and activity modification.  The patient stated their understanding with this and would like to proceed with nonsurgical management;    -pt would like self directed home exercises for now

## 2024-08-06 ENCOUNTER — CLINICAL DOCUMENTATION (OUTPATIENT)
Age: 49
End: 2024-08-06

## 2024-08-06 DIAGNOSIS — R73.03 PREDIABETES: ICD-10-CM

## 2024-08-06 DIAGNOSIS — E55.9 VITAMIN D DEFICIENCY: ICD-10-CM

## 2024-08-06 LAB
25(OH)D3 SERPL-MCNC: 11 NG/ML (ref 30–100)
ANION GAP SERPL CALC-SCNC: ABNORMAL MMOL/L (ref 5–15)
BUN SERPL-MCNC: 13 MG/DL (ref 6–20)
BUN/CREAT SERPL: 18 (ref 12–20)
CALCIUM SERPL-MCNC: 9.3 MG/DL (ref 8.5–10.1)
CHLORIDE SERPL-SCNC: 109 MMOL/L (ref 97–108)
CO2 SERPL-SCNC: 30 MMOL/L (ref 21–32)
CREAT SERPL-MCNC: 0.72 MG/DL (ref 0.55–1.02)
EST. AVERAGE GLUCOSE BLD GHB EST-MCNC: 111 MG/DL
GLUCOSE SERPL-MCNC: 93 MG/DL (ref 65–100)
HBA1C MFR BLD: 5.5 % (ref 4–5.6)
POTASSIUM SERPL-SCNC: 4 MMOL/L (ref 3.5–5.1)
SODIUM SERPL-SCNC: 138 MMOL/L (ref 136–145)

## 2024-08-06 NOTE — PROGRESS NOTES
PA initiated for phentermine 37.5 mg, denied. Your plan covers this drug for 3 months within the past 365 days for your health condition. We have denied this drug because it is for longer treatment.

## 2024-08-07 DIAGNOSIS — E55.9 VITAMIN D DEFICIENCY: Primary | ICD-10-CM

## 2024-08-07 RX ORDER — ERGOCALCIFEROL 1.25 MG/1
50000 CAPSULE ORAL WEEKLY
Qty: 12 CAPSULE | Refills: 1 | Status: SHIPPED | OUTPATIENT
Start: 2024-08-07

## 2024-09-04 ENCOUNTER — HOSPITAL ENCOUNTER (OUTPATIENT)
Facility: HOSPITAL | Age: 49
Setting detail: RECURRING SERIES
Discharge: HOME | End: 2024-09-07
Attending: STUDENT IN AN ORGANIZED HEALTH CARE EDUCATION/TRAINING PROGRAM
Payer: COMMERCIAL

## 2024-09-04 PROCEDURE — 97802 MEDICAL NUTRITION INDIV IN: CPT | Performed by: DIETITIAN, REGISTERED

## 2024-09-04 NOTE — PROGRESS NOTES
08/06/2024     (H) 08/06/2024    CO2 30 08/06/2024    BUN 13 08/06/2024    CREATININE 0.72 08/06/2024    GLUCOSE 93 08/06/2024    CALCIUM 9.3 08/06/2024    BILITOT 0.3 06/01/2023    ALKPHOS 76 06/01/2023    AST 11 (L) 06/01/2023    ALT 24 06/01/2023    LABGLOM >90 08/06/2024    GFRAA 117 07/15/2019    AGRATIO 1.3 07/15/2019    GLOB 3.8 06/01/2023     BP Readings from Last 3 Encounters:   08/05/24 122/78   05/24/24 126/80   01/29/24 116/72       Lab Results   Component Value Date    CHOL 168 06/01/2023    TRIG 50 06/01/2023    HDL 64 06/01/2023    LDL 94 06/01/2023    VLDL 10 06/01/2023    CHOLHDLRATIO 2.6 06/01/2023        Assessment:   Pt is here for help on her weight loss journey. Currently on Phentermine.   History of pre-diabetes. Last A1C is WNL.   has high blood pressure.     Wants to better understand better nutrition to support her weight loss.   Has done many weight loss programs: weight watchers (not discipline to log), medi weight loss (notes dissiplin is an issue), and something else.   Lack of motivation to continue the programs. Lack of variety and knowing how to make better choices on own.       Activity: none. Up at 5:30am. Always moving at work. Has ability to work from home some. Liked using [employer]. 5k steps per day at work.     Stress/emotional eating. 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     Not paying attention to hunger/fullness. Smaller portions with phentermine.     Wt Readings from Last 10 Encounters:   08/05/24 113.4 kg (250 lb)   05/24/24 113.9 kg (251 lb)   01/29/24 109 kg (240 lb 4.8 oz)   09/05/23 108.9 kg (240 lb)   06/01/23 110.2 kg (243 lb)   (       Food & Nutrition: Do you ever eat past feeling full? No longer since phentermine and gallbladder issues. Since July.   Do you ever eat when you are not hungry? yes   Boredom  no    Emotional eating /Stress or feeling

## 2024-09-20 ENCOUNTER — HOSPITAL ENCOUNTER (OUTPATIENT)
Facility: HOSPITAL | Age: 49
Setting detail: RECURRING SERIES
Discharge: HOME | End: 2024-09-23
Attending: STUDENT IN AN ORGANIZED HEALTH CARE EDUCATION/TRAINING PROGRAM
Payer: COMMERCIAL

## 2024-09-20 PROCEDURE — 97803 MED NUTRITION INDIV SUBSEQ: CPT | Performed by: DIETITIAN, REGISTERED

## 2024-10-30 ENCOUNTER — CLINICAL DOCUMENTATION (OUTPATIENT)
Age: 49
End: 2024-10-30

## 2024-10-30 ENCOUNTER — HOSPITAL ENCOUNTER (OUTPATIENT)
Facility: HOSPITAL | Age: 49
Setting detail: RECURRING SERIES
Discharge: HOME | End: 2024-11-02
Attending: STUDENT IN AN ORGANIZED HEALTH CARE EDUCATION/TRAINING PROGRAM
Payer: COMMERCIAL

## 2024-10-30 PROCEDURE — 97803 MED NUTRITION INDIV SUBSEQ: CPT | Performed by: DIETITIAN, REGISTERED

## 2024-10-30 NOTE — PROGRESS NOTES
NUTRITION - FOLLOW-UP TREATMENT NOTE  Patient Name: Mary Qureshi         Date: 10/30/2024  : 1975    YES Patient  Verified  Diagnosis:   Z68.42 (ICD-10-CM) - BMI 45.0-49.9, adult (HCC)   E66.01 (ICD-10-CM) - Class 3 obesity (HCC)      In time:   9:07am             Out time:   9:40am   Total Treatment Time (min):   33     SUBJECTIVE/ASSESSMENT  Current Wt: Not taken Previous Wt: 243.8 Wt Change: NA     Initial Wt: 245.2 Total Wt change: -1.4 Height: 57 (previous error)     Changes in medication or medical history? Any new allergies, surgeries or procedures?    No    If yes, update Summary List            Nutrition Diagnosis        Diagnosis Status:     food and nutrition related knowledge deficit R/T lack of prior education for healthy sustainable weight loss nutrition AEB request for counseling. Pt reports unsure how to pick healthy options on her own to modify favorite foods.      [x]  Improved []  No Change    []  Declined   []  Discontinued       Inadequate physical activity R/T low motivation AEB no current exercise routine compared to recommendations for 150 min minimum per week for general health and weight loss.     []  Improved []  No Change    [x]  Declined   []  Discontinued        Nutrition Monitoring and Evaluation: Changes since last visit:   -still choosing water  -lower carbohydrate snacks  -returned to chips due to stress and late night eating (ankle swelling). Wants to know about foods to decrease inflammation. Notes whole body stiffness.     Very high stress this past month.  When at home choosing foods for comfort more often than when on the go.   Unaware of sodium in current food choices.     Previous goals:  more aware. Returned to convenient. - Improve consistency of energy intake w/goal to plan meals with 3 food groups minimum. 3 meals and 1-2 snacks per day.     Met but not aware of sodium-pack snacks for at work. Aiming for < 20g total carb per snack     Not met - Improve

## 2024-12-03 ENCOUNTER — OFFICE VISIT (OUTPATIENT)
Age: 49
End: 2024-12-03
Payer: COMMERCIAL

## 2024-12-03 VITALS
RESPIRATION RATE: 17 BRPM | HEIGHT: 60 IN | WEIGHT: 240.3 LBS | DIASTOLIC BLOOD PRESSURE: 76 MMHG | SYSTOLIC BLOOD PRESSURE: 115 MMHG | HEART RATE: 78 BPM | BODY MASS INDEX: 47.18 KG/M2 | TEMPERATURE: 98.5 F | OXYGEN SATURATION: 99 %

## 2024-12-03 DIAGNOSIS — M54.40 CHRONIC BILATERAL LOW BACK PAIN WITH SCIATICA, SCIATICA LATERALITY UNSPECIFIED: ICD-10-CM

## 2024-12-03 DIAGNOSIS — G89.29 CHRONIC BILATERAL LOW BACK PAIN WITH SCIATICA, SCIATICA LATERALITY UNSPECIFIED: ICD-10-CM

## 2024-12-03 DIAGNOSIS — E66.813 CLASS 3 OBESITY: Primary | ICD-10-CM

## 2024-12-03 PROCEDURE — 99213 OFFICE O/P EST LOW 20 MIN: CPT | Performed by: STUDENT IN AN ORGANIZED HEALTH CARE EDUCATION/TRAINING PROGRAM

## 2024-12-03 NOTE — ASSESSMENT & PLAN NOTE
Continue working on diet and exercise.  Has phentermine which she takes sporadically.   Discussed precautions and need to stop if worsening side effects

## 2024-12-03 NOTE — PROGRESS NOTES
Chief Complaint   Patient presents with    3 Month Follow-Up     2024 Weight Management      \"Have you been to the ER, urgent care clinic since your last visit?  Hospitalized since your last visit?\"    NO    “Have you seen or consulted any other health care providers outside of [facility] since your last visit?”    NO    “Have you had a colorectal cancer screening such as a colonoscopy/FIT/Cologuard?      Scheduled for 10/18/2024 but GI canceled appt. Has not prescheduled, but will scheduled soon.     “Have you had a pap smear?”      Yes 2024 Dr[facility]       Vitals:    24 0923   BP: 115/76   Pulse: 78   Resp: 17   Temp: 98.5 °F (36.9 °C)   TempSrc: Temporal   SpO2: 99%   Weight: 109 kg (240 lb 4.8 oz)   Height: 1.511 m (4' 11.5\")      Health Maintenance Due   Topic Date Due    Cervical cancer screen  Never done    Colorectal Cancer Screen  Never done    Flu vaccine (1) Never done    COVID-19 Vaccine ( season) 2024      The patient, Mary Qureshi, identity was verified by name and , pharmacy verified  Labs:Yes  Fasting:Yes         
By: Casey Linares MD     December 3, 2024

## 2025-02-06 ENCOUNTER — CLINICAL DOCUMENTATION (OUTPATIENT)
Age: 50
End: 2025-02-06